# Patient Record
Sex: MALE | Race: WHITE | Employment: UNEMPLOYED | URBAN - METROPOLITAN AREA
[De-identification: names, ages, dates, MRNs, and addresses within clinical notes are randomized per-mention and may not be internally consistent; named-entity substitution may affect disease eponyms.]

---

## 2021-03-04 ENCOUNTER — HOSPITAL ENCOUNTER (EMERGENCY)
Age: 71
Discharge: HOME | End: 2021-03-04
Attending: EMERGENCY MEDICINE

## 2021-03-04 VITALS
HEART RATE: 80 BPM | RESPIRATION RATE: 16 BRPM | BODY MASS INDEX: 18.96 KG/M2 | OXYGEN SATURATION: 99 % | WEIGHT: 128 LBS | DIASTOLIC BLOOD PRESSURE: 80 MMHG | TEMPERATURE: 98.5 F | SYSTOLIC BLOOD PRESSURE: 140 MMHG | HEIGHT: 69 IN

## 2021-03-04 DIAGNOSIS — R33.9 URINARY RETENTION: Primary | ICD-10-CM

## 2021-03-04 DIAGNOSIS — F43.0 STRESS RESPONSE: ICD-10-CM

## 2021-03-04 DIAGNOSIS — N28.9 ACUTE RENAL INSUFFICIENCY: ICD-10-CM

## 2021-03-04 LAB
ALBUMIN SERPL-MCNC: 3.7 G/DL (ref 3.5–5)
ALBUMIN/GLOB SERPL: 1.1 {RATIO} (ref 1.1–2.2)
ALP SERPL-CCNC: 83 U/L (ref 45–117)
ALT SERPL-CCNC: 44 U/L (ref 12–78)
ANION GAP SERPL CALC-SCNC: 10 MMOL/L (ref 5–15)
ANION GAP SERPL CALC-SCNC: 14 MMOL/L (ref 5–15)
APPEARANCE UR: ABNORMAL
AST SERPL-CCNC: 40 U/L (ref 15–37)
BACTERIA URNS QL MICRO: NEGATIVE /HPF
BASOPHILS # BLD: 0 K/UL (ref 0–0.1)
BASOPHILS NFR BLD: 0 % (ref 0–1)
BILIRUB SERPL-MCNC: 0.7 MG/DL (ref 0.2–1)
BILIRUB UR QL: NEGATIVE
BUN SERPL-MCNC: 50 MG/DL (ref 6–20)
BUN SERPL-MCNC: 57 MG/DL (ref 6–20)
BUN/CREAT SERPL: 17 (ref 12–20)
BUN/CREAT SERPL: 20 (ref 12–20)
CALCIUM SERPL-MCNC: 8.4 MG/DL (ref 8.5–10.1)
CALCIUM SERPL-MCNC: 9.2 MG/DL (ref 8.5–10.1)
CHLORIDE SERPL-SCNC: 105 MMOL/L (ref 97–108)
CHLORIDE SERPL-SCNC: 111 MMOL/L (ref 97–108)
CO2 SERPL-SCNC: 26 MMOL/L (ref 21–32)
CO2 SERPL-SCNC: 27 MMOL/L (ref 21–32)
COLOR UR: ABNORMAL
CREAT SERPL-MCNC: 2.47 MG/DL (ref 0.7–1.3)
CREAT SERPL-MCNC: 3.29 MG/DL (ref 0.7–1.3)
DIFFERENTIAL METHOD BLD: ABNORMAL
EOSINOPHIL # BLD: 0 K/UL (ref 0–0.4)
EOSINOPHIL NFR BLD: 0 % (ref 0–7)
EPITH CASTS URNS QL MICRO: ABNORMAL /LPF
ERYTHROCYTE [DISTWIDTH] IN BLOOD BY AUTOMATED COUNT: 12.9 % (ref 11.5–14.5)
GLOBULIN SER CALC-MCNC: 3.4 G/DL (ref 2–4)
GLUCOSE SERPL-MCNC: 117 MG/DL (ref 65–100)
GLUCOSE SERPL-MCNC: 141 MG/DL (ref 65–100)
GLUCOSE UR STRIP.AUTO-MCNC: NEGATIVE MG/DL
HCT VFR BLD AUTO: 47.7 % (ref 36.6–50.3)
HGB BLD-MCNC: 15.7 G/DL (ref 12.1–17)
HGB UR QL STRIP: ABNORMAL
IMM GRANULOCYTES # BLD AUTO: 0 K/UL (ref 0–0.04)
IMM GRANULOCYTES NFR BLD AUTO: 0 % (ref 0–0.5)
KETONES UR QL STRIP.AUTO: ABNORMAL MG/DL
LEUKOCYTE ESTERASE UR QL STRIP.AUTO: NEGATIVE
LYMPHOCYTES # BLD: 0.3 K/UL (ref 0.8–3.5)
LYMPHOCYTES NFR BLD: 2 % (ref 12–49)
MCH RBC QN AUTO: 30.2 PG (ref 26–34)
MCHC RBC AUTO-ENTMCNC: 32.9 G/DL (ref 30–36.5)
MCV RBC AUTO: 91.7 FL (ref 80–99)
MONOCYTES # BLD: 1.2 K/UL (ref 0–1)
MONOCYTES NFR BLD: 9 % (ref 5–13)
NEUTS SEG # BLD: 11.6 K/UL (ref 1.8–8)
NEUTS SEG NFR BLD: 89 % (ref 32–75)
NITRITE UR QL STRIP.AUTO: NEGATIVE
NRBC # BLD: 0 K/UL (ref 0–0.01)
NRBC BLD-RTO: 0 PER 100 WBC
PH UR STRIP: 5.5 [PH] (ref 5–8)
PLATELET # BLD AUTO: 159 K/UL (ref 150–400)
PLATELET COMMENTS,PCOM: ABNORMAL
PMV BLD AUTO: 11.9 FL (ref 8.9–12.9)
POTASSIUM SERPL-SCNC: 4.1 MMOL/L (ref 3.5–5.1)
POTASSIUM SERPL-SCNC: 4.2 MMOL/L (ref 3.5–5.1)
PROT SERPL-MCNC: 7.1 G/DL (ref 6.4–8.2)
PROT UR STRIP-MCNC: 30 MG/DL
RBC # BLD AUTO: 5.2 M/UL (ref 4.1–5.7)
RBC #/AREA URNS HPF: ABNORMAL /HPF (ref 0–5)
RBC MORPH BLD: ABNORMAL
SODIUM SERPL-SCNC: 145 MMOL/L (ref 136–145)
SODIUM SERPL-SCNC: 148 MMOL/L (ref 136–145)
SP GR UR REFRACTOMETRY: 1.01 (ref 1–1.03)
UA: UC IF INDICATED,UAUC: ABNORMAL
UROBILINOGEN UR QL STRIP.AUTO: 0.2 EU/DL (ref 0.2–1)
WBC # BLD AUTO: 13.1 K/UL (ref 4.1–11.1)
WBC URNS QL MICRO: ABNORMAL /HPF (ref 0–4)

## 2021-03-04 PROCEDURE — 51798 US URINE CAPACITY MEASURE: CPT

## 2021-03-04 PROCEDURE — 99285 EMERGENCY DEPT VISIT HI MDM: CPT

## 2021-03-04 PROCEDURE — 74011000250 HC RX REV CODE- 250: Performed by: EMERGENCY MEDICINE

## 2021-03-04 PROCEDURE — 80053 COMPREHEN METABOLIC PANEL: CPT

## 2021-03-04 PROCEDURE — 81001 URINALYSIS AUTO W/SCOPE: CPT

## 2021-03-04 PROCEDURE — 85025 COMPLETE CBC W/AUTO DIFF WBC: CPT

## 2021-03-04 PROCEDURE — 36415 COLL VENOUS BLD VENIPUNCTURE: CPT

## 2021-03-04 PROCEDURE — 74011250636 HC RX REV CODE- 250/636: Performed by: EMERGENCY MEDICINE

## 2021-03-04 PROCEDURE — 87086 URINE CULTURE/COLONY COUNT: CPT

## 2021-03-04 RX ORDER — LIDOCAINE HYDROCHLORIDE 20 MG/ML
JELLY TOPICAL
Status: COMPLETED | OUTPATIENT
Start: 2021-03-04 | End: 2021-03-04

## 2021-03-04 RX ADMIN — SODIUM CHLORIDE 1000 ML: 9 INJECTION, SOLUTION INTRAVENOUS at 20:06

## 2021-03-04 RX ADMIN — LIDOCAINE HYDROCHLORIDE: 20 JELLY TOPICAL at 20:06

## 2021-03-04 RX ADMIN — SODIUM CHLORIDE 1000 ML: 9 INJECTION, SOLUTION INTRAVENOUS at 21:09

## 2021-03-04 NOTE — ED TRIAGE NOTES
Pt brought to the ED  By Gary W Melodie Holbrook as an ECO initiated at 17:03. Pt is AAOX4 with a c/c of mental health problem and bilateral flank pain since Tuesday. Pt states he has trouble urinating and he strains a lot but urine does not come out. Pt's abd is noted distended. Pt states on Tuesday he had severe bilateral flank pain and to deal with the pain he cut his LUE with a . Pt refused RN to look at self inflicted cut. Pt states he was not feeling suicidal. Pt denies any SI/HI or hallucinations. Pt states he does not have any Psychiatric medical hx. Pt refused to change out of his street clothes. [employer] officer at bedside. Pt is noted in stable condition, now in ED room with side rail up, bed to lowest position and call light within reach. Will continue to monitor and wait for ED provider evaluation. Emergency Department Nursing Plan of Care       The Nursing Plan of Care is developed from the Nursing assessment and Emergency Department Attending provider initial evaluation. The plan of care may be reviewed in the ED Provider note.     The Plan of Care was developed with the following considerations:   Patient / Family readiness to learn indicated by:verbalized understanding  Persons(s) to be included in education: patient  Barriers to Learning/Limitations:No    Signed     Ambreen Morrissey    3/4/2021   6:18 PM

## 2021-03-05 NOTE — PROGRESS NOTES
Date of previous inpatient admission/ ED visit? New encounter. What brought the patient back to ED? Pt brought to the ED  By Toño DUMONT as an ECO initiated at 17:03. Pt is AAOX4 with a c/c of mental health problem and bilateral flank pain since Tuesday. Pt states he has trouble urinating and he strains a lot but urine does not come out. Pt's abd is noted distended. Pt states on Tuesday he had severe bilateral flank pain and to deal with the pain he cut his LUE with a . Pt refused RN to look at self inflicted cut. Pt states he was not feeling suicidal. Pt denies any SI/HI or hallucinations. Pt states he does not have any Psychiatric medical hx. Pt refused to change out of his street clothes. [employer] officer at bedside. Pt is noted in stable condition, now in ED room with side rail up, bed to lowest position and call light within reach. Will continue to monitor and wait for ED provider evaluation. Did patient decline recommended services during last admission/ ED visit (if yes, what)? No     Has patient seen a provider since their last inpatient admission/ED visit (if yes, when)? Unknown     PCP: First and Last name: none    Name of Practice:    Are you a current patient: Yes/No:    Approximate date of last visit:    [facility] Interventions:  From previous inpatient admission/ED visit:  From current inpatient admission/ED visit     CM opened case for assessment of D/C planning needs, CM reviewed chart. Patient recently lost his mom states \"my dad was a bum, my mother was a strong [place] woman she raised us on her own\"   CM was ask to assist patient with insurance per conversation. Patient states he use to work for [employer] and retired with [employer] had pension with them receiving his checks but never applied for medicare did not know he had to apply. Patient has no insurance at this time upset he was brought to the hospital and worries about this bill.  CM spoke with patient at bedside inform not an easy process to get his Medicare benefit but referral for Medicaid would be more quicker. Discuss follow-up with Senior connections for support in the community. Patient does not have a reliable phone states he canceled his phone best way of getting in contact with him is through the mail. Patient has a neighbor that he uses his phone from time to time and talks to him from time to time but did not want to inconvenient his neighbor . 1943 CM inform patient will need to follow-up with Urology for urinary retention. Office is close at this time CM not able to schedule follow-up appointment for patient will have difficulty reaching patient for follow-up since patient will be discharge with a ceron needs follow-up. 1950 CM spoke with patient inform of follow-up with Urology patient states he is able to call in the morning CM inform patient office has financial assistance and to ask for an application. 2149 Per chart provider would like to admit patient at this time patient is not willing to stay. CM spoke with patient he is concern about the hospital bill. CM discuss the  care application and inform him what he needs to do to apply. Discuss Medicaid with patient again he stated that if he had the number he would be able to call medassist and patient access. CM added information to discharge AVS for patient to follow-up. Patient is able to drive himself to the appointment.      [address]  [phone]

## 2021-03-05 NOTE — ED NOTES
9:07 PM-discussed results and plan with patient. Expressed my concern about his renal function as we do not have a baseline. Discussed admitting him to the hospital for urology and possible nephrology consult. He states he cannot stay in the hospital.  Plan will be to give 2 L of saline and recheck creatinine. If that is improving we will discharge him with a leg bag catheter and close follow-up to urology. He is aware though that his renal function could worsen as we still do not know the obvious cause of his obstruction. He understands that and will make sure he does the follow-up as needed. Also discussed with him his self injury thoughts that he had on Tuesday. He states this was all related to severe pain and just wanted to end things because he was hurting. He is very remorseful and states that he had gone to the hospital and had the catheter he would not have even had that thought and does not have any suicidal homicidal thoughts at this time. 10:30 PM patient continues to be stable. 2000 cc urine out. Will repeat chemistries after 2 L saline. Recommend patient get a CT due to his prior flank pain to rule out any other obstructive uropathy. Patient declines a CT at this time. He is made aware that this could put him at risk for renal failure other possibilities that go undiagnosed. He would follow-up with urology as planned. 10:53 PM patient's repeat creatinine improved at 2.47. Again reinforced the importance of close follow-up with urology. We did offer admission for further evaluation of his urinary obstruction and the patient declined. He is aware that there is a risk of missed diagnosis including tumor, aneurysm and risk to loss of life. His vital signs are stable he is ready for discharge.

## 2021-03-05 NOTE — ED NOTES
Patient stated he feels much better with the urinary catheter. Stated the pressure and pain in his abdomen and kidneys have improved. Denies pain currently. Pt resting quietly in room at this time. No complaints.

## 2021-03-05 NOTE — ED NOTES
Patient has several superficial lacerations on his left wrist and elbow. [facility] and placed sterile strips on lacerations. This RN looked at the lacerations with the MD and placed new padding and guaze to wounds. No bleeding or drainage noted.

## 2021-03-05 NOTE — ED NOTES
Bedside and Verbal shift change report given to Ekta Oconnell (oncoming nurse) by Ivett Ulloa (offgoing nurse). Report included the following information SBAR, Kardex, ED Summary and Intake/Output.

## 2021-03-05 NOTE — ED PROVIDER NOTES
EMERGENCY DEPARTMENT HISTORY AND PHYSICAL EXAM      Date: 3/4/2021  Patient Name: Alphonso Dorsey    History of Presenting Illness     Chief Complaint   Patient presents with   [address] Problem    Flank Pain     bilateral        History Provided By: Patient    HPI: Alphonso Dorsey, 73 y.o. male with PMHx significant for nothing who presents in police custody for mental health problem. Patient reports that he began having some flank pain and lower abdominal distention 2 days ago. Due to the significant amount of pain he cut himself on his wrist.  [facility] and they became concerned given his suicidal gesture and therefore got an E CO and he was brought to the emergency department. Patient denies any suicidal or homicidal thoughts. He said he just cut himself due to the severity of his pain. Denies any prior psychiatric history. No CP, SOB, N/V. Does state he has been having difficulty urinating and is urinating and \"dribbles. \"      PCP: No primary care provider on file. There are no other complaints, changes, or physical findings at this time. Current Facility-Administered Medications   Medication Dose Route Frequency Provider Last Rate Last Admin    lidocaine (URO-JET/ GLYDO) 2 % jelly   Urethral NOW Catlett, Arlyne Gosselin, MD         Past History     Past Medical History:  History reviewed. No pertinent past medical history. Past Surgical History:  History reviewed. No pertinent surgical history. Family History:  History reviewed. No pertinent family history. Social History:  Social History     Tobacco Use    Smoking status: Never Smoker    Smokeless tobacco: Never Used   Substance Use Topics    Alcohol use: Not Currently    Drug use: Never     Allergies:  No Known Allergies  Review of Systems   Review of Systems   Constitutional: Negative for chills and fever. HENT: Negative for congestion, rhinorrhea and sore throat.     Respiratory: Negative for cough and shortness of breath. Cardiovascular: Negative for chest pain. Gastrointestinal: Negative for abdominal pain, nausea and vomiting. Genitourinary: Positive for decreased urine volume and flank pain. Negative for dysuria and urgency. Skin: Negative for rash. Neurological: Negative for dizziness, light-headedness and headaches. All other systems reviewed and are negative. Physical Exam   Physical Exam  Vitals signs and nursing note reviewed. Constitutional:       General: He is not in acute distress. Appearance: He is well-developed. HENT:      Head: Normocephalic and atraumatic. Eyes:      Conjunctiva/sclera: Conjunctivae normal.      Pupils: Pupils are equal, round, and reactive to light. Neck:      Musculoskeletal: Normal range of motion. Cardiovascular:      Rate and Rhythm: Normal rate and regular rhythm. Pulmonary:      Effort: Pulmonary effort is normal. No respiratory distress. Breath sounds: Normal breath sounds. No stridor. Abdominal:      General: There is distension (suprapubic). Palpations: Abdomen is soft. Tenderness: There is no abdominal tenderness. Comments: palpable bladder   Musculoskeletal: Normal range of motion. Skin:     General: Skin is warm and dry. Comments: Superficial cuts over the left wrist and left AC   Neurological:      Mental Status: He is alert and oriented to person, place, and time. Psychiatric:         Thought Content: Thought content does not include homicidal or suicidal ideation. Diagnostic Study Results   Labs -     Recent Results (from the past 12 hour(s))   CBC WITH AUTOMATED DIFF    Collection Time: 03/04/21  6:55 PM   Result Value Ref Range    WBC 13.1 (H) 4.1 - 11.1 K/uL    RBC 5.20 4. 10 - 5.70 M/uL    HGB 15.7 12.1 - 17.0 g/dL    HCT 47.7 36.6 - 50.3 %    MCV 91.7 80.0 - 99.0 FL    MCH 30.2 26.0 - 34.0 PG    MCHC 32.9 30.0 - 36.5 g/dL    RDW 12.9 11.5 - 14.5 %    PLATELET 995 634 - 400 K/uL    MPV 11.9 8.9 - 12.9 FL    NRBC 0.0 0  WBC    ABSOLUTE NRBC 0.00 0.00 - 0.01 K/uL    NEUTROPHILS PENDING %    LYMPHOCYTES PENDING %    MONOCYTES PENDING %    EOSINOPHILS PENDING %    BASOPHILS PENDING %    IMMATURE GRANULOCYTES PENDING %    ABS. NEUTROPHILS PENDING K/UL    ABS. LYMPHOCYTES PENDING K/UL    ABS. MONOCYTES PENDING K/UL    ABS. EOSINOPHILS PENDING K/UL    ABS. BASOPHILS PENDING K/UL    ABS. IMM. GRANS. PENDING K/UL    DF PENDING      Recent Results (from the past 24 hour(s))   CBC WITH AUTOMATED DIFF    Collection Time: 03/04/21  6:55 PM   Result Value Ref Range    WBC 13.1 (H) 4.1 - 11.1 K/uL    RBC 5.20 4. 10 - 5.70 M/uL    HGB 15.7 12.1 - 17.0 g/dL    HCT 47.7 36.6 - 50.3 %    MCV 91.7 80.0 - 99.0 FL    MCH 30.2 26.0 - 34.0 PG    MCHC 32.9 30.0 - 36.5 g/dL    RDW 12.9 11.5 - 14.5 %    PLATELET 326 223 - 719 K/uL    MPV 11.9 8.9 - 12.9 FL    NRBC 0.0 0  WBC    ABSOLUTE NRBC 0.00 0.00 - 0.01 K/uL    NEUTROPHILS 89 (H) 32 - 75 %    LYMPHOCYTES 2 (L) 12 - 49 %    MONOCYTES 9 5 - 13 %    EOSINOPHILS 0 0 - 7 %    BASOPHILS 0 0 - 1 %    IMMATURE GRANULOCYTES 0 0.0 - 0.5 %    ABS. NEUTROPHILS 11.6 (H) 1.8 - 8.0 K/UL    ABS. LYMPHOCYTES 0.3 (L) 0.8 - 3.5 K/UL    ABS. MONOCYTES 1.2 (H) 0.0 - 1.0 K/UL    ABS. EOSINOPHILS 0.0 0.0 - 0.4 K/UL    ABS. BASOPHILS 0.0 0.0 - 0.1 K/UL    ABS. IMM.  GRANS. 0.0 0.00 - 0.04 K/UL    DF SMEAR SCANNED      PLATELET COMMENTS Large Platelets      RBC COMMENTS NORMOCYTIC, NORMOCHROMIC     METABOLIC PANEL, COMPREHENSIVE    Collection Time: 03/04/21  6:55 PM   Result Value Ref Range    Sodium 145 136 - 145 mmol/L    Potassium 4.2 3.5 - 5.1 mmol/L    Chloride 105 97 - 108 mmol/L    CO2 26 21 - 32 mmol/L    Anion gap 14 5 - 15 mmol/L    Glucose 141 (H) 65 - 100 mg/dL    BUN 57 (H) 6 - 20 MG/DL    Creatinine 3.29 (H) 0.70 - 1.30 MG/DL    BUN/Creatinine ratio 17 12 - 20      GFR est AA 23 (L) >60 ml/min/1.73m2    GFR est non-AA 19 (L) >60 ml/min/1.73m2 Calcium 9.2 8.5 - 10.1 MG/DL    Bilirubin, total 0.7 0.2 - 1.0 MG/DL    ALT (SGPT) 44 12 - 78 U/L    AST (SGOT) 40 (H) 15 - 37 U/L    Alk. phosphatase 83 45 - 117 U/L    Protein, total 7.1 6.4 - 8.2 g/dL    Albumin 3.7 3.5 - 5.0 g/dL    Globulin 3.4 2.0 - 4.0 g/dL    A-G Ratio 1.1 1.1 - 2.2     URINALYSIS W/ REFLEX CULTURE    Collection Time: 03/04/21  8:15 PM    Specimen: Urine   Result Value Ref Range    Color YELLOW/STRAW      Appearance CLOUDY (A) CLEAR      Specific gravity 1.015 1.003 - 1.030      pH (UA) 5.5 5.0 - 8.0      Protein 30 (A) NEG mg/dL    Glucose Negative NEG mg/dL    Ketone TRACE (A) NEG mg/dL    Bilirubin Negative NEG      Blood LARGE (A) NEG      Urobilinogen 0.2 0.2 - 1.0 EU/dL    Nitrites Negative NEG      Leukocyte Esterase Negative NEG      WBC 10-20 0 - 4 /hpf    RBC 20-50 0 - 5 /hpf    Epithelial cells FEW FEW /lpf    Bacteria Negative NEG /hpf    UA:UC IF INDICATED URINE CULTURE ORDERED (A) CNI     METABOLIC PANEL, BASIC    Collection Time: 03/04/21  9:55 PM   Result Value Ref Range    Sodium 148 (H) 136 - 145 mmol/L    Potassium 4.1 3.5 - 5.1 mmol/L    Chloride 111 (H) 97 - 108 mmol/L    CO2 27 21 - 32 mmol/L    Anion gap 10 5 - 15 mmol/L    Glucose 117 (H) 65 - 100 mg/dL    BUN 50 (H) 6 - 20 MG/DL    Creatinine 2.47 (H) 0.70 - 1.30 MG/DL    BUN/Creatinine ratio 20 12 - 20      GFR est AA 32 (L) >60 ml/min/1.73m2    GFR est non-AA 26 (L) >60 ml/min/1.73m2    Calcium 8.4 (L) 8.5 - 10.1 MG/DL         Radiologic Studies -   No orders to display     No results found. Medical Decision Making   I am the first provider for this patient. I reviewed the vital signs, available nursing notes, past medical history, past surgical history, family history and social history. Vital Signs-Reviewed the patient's vital signs.   Patient Vitals for the past 12 hrs:   Temp Pulse Resp BP SpO2   03/04/21 1800 98.4 °F (36.9 °C) 88 16 (!) 141/84 100 %       Pulse Oximetry Analysis - 99% on ra      Records Reviewed: Nursing Notes    Provider Notes (Medical Decision Making):   Patient presents in police custody for mental health problem. Tells me that he cut his left upper extremity several days ago due to pain in his back. States he was not trying to kill himself but it was a way to relieve the pain. Denies any current suicidal thoughts. Sarah Bentley for crisis to see the patient. On exam the patient has significant suprapubic distention a palpable bladder. I am concerned his pain is secondary to urinary retention. Patient declines any additional work-up at this time until he spoke with a counselor. ED Course:   Initial assessment performed. The patients presenting problems have been discussed, and they are in agreement with the care plan formulated and outlined with them. I have encouraged them to ask questions as they arise throughout their visit. Patient has been seen by crisis and cleared from a psychiatric standpoint. He is now agreeable to lab work, bladder scan, and likely Ceron catheter placement given his exam.    ED Course as of Mar 04 1924   Thu Mar 04, 2021   1912 Bladder scan with over 1200cc. Plan for ceron, UA.    [BARTOLO]      ED Course User Index  Anna Lara MD       Labs returned with an elevated creatinine, suspect likely secondary to obstruction. Plan for Ceron catheter placement, IV fluids. Patient signed out to Dr. Raji Umaña. Patient does not wish to stay in the hospital, but is agreeable to IV fluids and a repeat creatinine. If no significant improvement plan for repeat discussion about possible hospitalization. Please see Dr. [facility] separate note for updates. Denzel Chavira MD      Procedures:  Procedures    Critical Care:  none    Disposition:  Discharge    PLAN:  1. There are no discharge medications for this patient.     2.   Follow-up Information     Follow up With Specialties Details Why Contact [facility] Office on [address]  will call for assistance to apply for medicaid and medicare. [address]  [facility]   Patient to call office and schedule follow-up as discuss it is important you call and schedule the appointment. [address] [address]    Medicaid application  Schedule an appointment as soon as possible for a visit in 1 day  Please call [facility] care card application   Please use for additional services. Please call Ms. [address]    [facility]   If symptoms worsen [address]    [facility] EMERGENCY DEPT Emergency Medicine   [facility]  [phone]        Return to ED if worse     Diagnosis     Clinical Impression:   1. Urinary retention    2. Acute renal insufficiency    3. Stress response            Please note that this dictation was completed with [employer], the computer voice recognition software. Quite often unanticipated grammatical, syntax, homophones, and other interpretive errors are inadvertently transcribed by the computer software. Please disregard these errors.   Please excuse any errors that have escaped final proofreading

## 2021-03-06 LAB
BACTERIA SPEC CULT: NORMAL
SERVICE CMNT-IMP: NORMAL

## 2021-03-13 ENCOUNTER — HOSPITAL ENCOUNTER (EMERGENCY)
Age: 71
Discharge: HOME | End: 2021-03-13
Attending: EMERGENCY MEDICINE

## 2021-03-13 VITALS
TEMPERATURE: 98.7 F | HEART RATE: 84 BPM | RESPIRATION RATE: 18 BRPM | OXYGEN SATURATION: 97 % | DIASTOLIC BLOOD PRESSURE: 89 MMHG | BODY MASS INDEX: 19.2 KG/M2 | SYSTOLIC BLOOD PRESSURE: 135 MMHG | WEIGHT: 130 LBS

## 2021-03-13 DIAGNOSIS — R33.9 URINARY RETENTION: Primary | ICD-10-CM

## 2021-03-13 DIAGNOSIS — N30.01 ACUTE CYSTITIS WITH HEMATURIA: ICD-10-CM

## 2021-03-13 LAB
APPEARANCE UR: CLEAR
BACTERIA URNS QL MICRO: ABNORMAL /HPF
BILIRUB UR QL: NEGATIVE
COLOR UR: ABNORMAL
EPITH CASTS URNS QL MICRO: ABNORMAL /LPF
GLUCOSE UR STRIP.AUTO-MCNC: NEGATIVE MG/DL
HGB UR QL STRIP: ABNORMAL
KETONES UR QL STRIP.AUTO: NEGATIVE MG/DL
LEUKOCYTE ESTERASE UR QL STRIP.AUTO: ABNORMAL
NITRITE UR QL STRIP.AUTO: POSITIVE
PH UR STRIP: 5.5 [PH] (ref 5–8)
PROT UR STRIP-MCNC: NEGATIVE MG/DL
RBC #/AREA URNS HPF: ABNORMAL /HPF (ref 0–5)
SP GR UR REFRACTOMETRY: 1.02 (ref 1–1.03)
UA: UC IF INDICATED,UAUC: ABNORMAL
UROBILINOGEN UR QL STRIP.AUTO: 0.2 EU/DL (ref 0.2–1)
WBC URNS QL MICRO: ABNORMAL /HPF (ref 0–4)

## 2021-03-13 PROCEDURE — 87186 SC STD MICRODIL/AGAR DIL: CPT

## 2021-03-13 PROCEDURE — 81001 URINALYSIS AUTO W/SCOPE: CPT

## 2021-03-13 PROCEDURE — 99284 EMERGENCY DEPT VISIT MOD MDM: CPT

## 2021-03-13 PROCEDURE — 87077 CULTURE AEROBIC IDENTIFY: CPT

## 2021-03-13 PROCEDURE — 87086 URINE CULTURE/COLONY COUNT: CPT

## 2021-03-13 PROCEDURE — 51702 INSERT TEMP BLADDER CATH: CPT

## 2021-03-13 RX ORDER — CIPROFLOXACIN 500 MG/1
500 TABLET ORAL 2 TIMES DAILY
Qty: 20 TAB | Refills: 0 | Status: SHIPPED | OUTPATIENT
Start: 2021-03-13 | End: 2021-03-23

## 2021-03-13 NOTE — ED TRIAGE NOTES
Requests FC. Pt had one removed by the urologist and refused to have another placed but has been unable to urinate since then.

## 2021-03-13 NOTE — ED NOTES
Patient presents to the ED with c/o urinary retention. Pt reports having a ceron removed on Thursday and he has not been able to urinate. Pt reports some \"dripping and leaking. \" Pt reports lower abdominal pain. [facility]. Pt is alert and oriented. Pt skin is warm and dry. Pt is ambulatory independently. .      Emergency Department Nursing Plan of Care       The Nursing Plan of Care is developed from the Nursing assessment and Emergency Department Attending provider initial evaluation. The plan of care may be reviewed in the ED Provider note.     The Plan of Care was developed with the following considerations:   Patient / Family readiness to learn indicated by:verbalized understanding  Persons(s) to be included in education: patient  Barriers to Learning/Limitations:No    Signed     Clark Mcmahan    3/13/2021   10:06 AM
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
## 2021-03-13 NOTE — ED NOTES
1400 mL drained when ceron was placed. Leg bag placed on patient per patient request. PT instructed and verbalized understanding of ceron care and draining the leg bag.

## 2021-03-15 LAB
BACTERIA SPEC CULT: ABNORMAL
CC UR VC: ABNORMAL
SERVICE CMNT-IMP: ABNORMAL

## 2022-04-21 ENCOUNTER — HOSPITAL ENCOUNTER (OUTPATIENT)
Dept: INTERVENTIONAL RADIOLOGY/VASCULAR | Age: 72
Discharge: HOME | End: 2022-04-21
Attending: STUDENT IN AN ORGANIZED HEALTH CARE EDUCATION/TRAINING PROGRAM | Admitting: STUDENT IN AN ORGANIZED HEALTH CARE EDUCATION/TRAINING PROGRAM
Payer: MEDICARE

## 2022-04-21 VITALS
SYSTOLIC BLOOD PRESSURE: 114 MMHG | RESPIRATION RATE: 18 BRPM | HEART RATE: 72 BPM | WEIGHT: 135 LBS | TEMPERATURE: 98.4 F | DIASTOLIC BLOOD PRESSURE: 77 MMHG | HEIGHT: 69 IN | OXYGEN SATURATION: 98 % | BODY MASS INDEX: 19.99 KG/M2

## 2022-04-21 DIAGNOSIS — R33.9 URINARY RETENTION: ICD-10-CM

## 2022-04-21 PROCEDURE — 77030040831 HC BAG URINE DRNG MDII -A

## 2022-04-21 PROCEDURE — C1769 GUIDE WIRE: HCPCS

## 2022-04-21 PROCEDURE — 77002 NEEDLE LOCALIZATION BY XRAY: CPT

## 2022-04-21 PROCEDURE — 74011000250 HC RX REV CODE- 250: Performed by: STUDENT IN AN ORGANIZED HEALTH CARE EDUCATION/TRAINING PROGRAM

## 2022-04-21 PROCEDURE — 2709999900 HC NON-CHARGEABLE SUPPLY

## 2022-04-21 PROCEDURE — 74011250636 HC RX REV CODE- 250/636: Performed by: STUDENT IN AN ORGANIZED HEALTH CARE EDUCATION/TRAINING PROGRAM

## 2022-04-21 PROCEDURE — 77030011230 HC DIL VESL COON COOK -B

## 2022-04-21 PROCEDURE — C1892 INTRO/SHEATH,FIXED,PEEL-AWAY: HCPCS

## 2022-04-21 PROCEDURE — 77030011229 HC DIL VESL COON COOK -A

## 2022-04-21 RX ORDER — MIDAZOLAM HYDROCHLORIDE 1 MG/ML
5 INJECTION, SOLUTION INTRAMUSCULAR; INTRAVENOUS
Status: DISCONTINUED | OUTPATIENT
Start: 2022-04-21 | End: 2022-04-21 | Stop reason: HOSPADM

## 2022-04-21 RX ORDER — SODIUM CHLORIDE 9 MG/ML
50 INJECTION, SOLUTION INTRAVENOUS CONTINUOUS
Status: DISCONTINUED | OUTPATIENT
Start: 2022-04-21 | End: 2022-04-21 | Stop reason: HOSPADM

## 2022-04-21 RX ORDER — LIDOCAINE HYDROCHLORIDE 20 MG/ML
20 INJECTION, SOLUTION INFILTRATION; PERINEURAL ONCE
Status: COMPLETED | OUTPATIENT
Start: 2022-04-21 | End: 2022-04-21

## 2022-04-21 RX ORDER — FENTANYL CITRATE 50 UG/ML
200 INJECTION, SOLUTION INTRAMUSCULAR; INTRAVENOUS
Status: DISCONTINUED | OUTPATIENT
Start: 2022-04-21 | End: 2022-04-21 | Stop reason: HOSPADM

## 2022-04-21 RX ORDER — NALOXONE HYDROCHLORIDE 0.4 MG/ML
0.4 INJECTION, SOLUTION INTRAMUSCULAR; INTRAVENOUS; SUBCUTANEOUS AS NEEDED
Status: DISCONTINUED | OUTPATIENT
Start: 2022-04-21 | End: 2022-04-21 | Stop reason: HOSPADM

## 2022-04-21 RX ORDER — FLUMAZENIL 0.1 MG/ML
0.5 INJECTION INTRAVENOUS ONCE
Status: DISCONTINUED | OUTPATIENT
Start: 2022-04-21 | End: 2022-04-21 | Stop reason: HOSPADM

## 2022-04-21 RX ADMIN — FENTANYL CITRATE 50 MCG: 50 INJECTION, SOLUTION INTRAMUSCULAR; INTRAVENOUS at 09:24

## 2022-04-21 RX ADMIN — CEFAZOLIN SODIUM 2 G: 1 INJECTION, POWDER, FOR SOLUTION INTRAMUSCULAR; INTRAVENOUS at 08:21

## 2022-04-21 RX ADMIN — MIDAZOLAM 2 MG: 1 INJECTION INTRAMUSCULAR; INTRAVENOUS at 09:15

## 2022-04-21 RX ADMIN — FENTANYL CITRATE 100 MCG: 50 INJECTION, SOLUTION INTRAMUSCULAR; INTRAVENOUS at 09:15

## 2022-04-21 RX ADMIN — LIDOCAINE HYDROCHLORIDE 10 ML: 20 INJECTION, SOLUTION INFILTRATION; PERINEURAL at 09:21

## 2022-04-21 RX ADMIN — MIDAZOLAM 1 MG: 1 INJECTION INTRAMUSCULAR; INTRAVENOUS at 09:24

## 2022-04-21 NOTE — DISCHARGE INSTRUCTIONS
Patient Education        Suprapubic Catheter Care: Care Instructions  Overview  A suprapubic catheter is a thin tube that drains urine from your bladder. The tube is put into your bladder through a small cut in your lower belly. The urine collects in a bag attached to the tube. The bag is usually attached to your leg. Sometimes the catheter tube has a valve that lets you drain the urine into the toilet or other container. You may need a suprapubic catheter if you have nerve damage, a problem with your urinary tract, or a disease that weakens your muscles. Having a catheter for a long time increases the risk of getting a urinary tract infection. So catheter care focuses on preventing infection. Follow-up care is a key part of your treatment and safety. Be sure to make and go to all appointments, and call your doctor if you are having problems. It's also a good idea to know your test results and keep a list of the medicines you take. How can you care for yourself at home? · Wash your hands before you handle the catheter. · Clean the area around the catheter with soap and water daily. · Keep the drainage bag lower than your bladder to keep urine from backing up. · Clean the bag every day after removing it from the catheter. Use another container while you clean the bag. To clean the bag, fill it with 2 parts vinegar to 3 parts water and let it stand for 20 minutes. Then empty it out, and let it air dry. · Empty the drainage bag when it is full or at least every 8 hours. When should you call for help? Call your doctor now or seek immediate medical care if:    · Your catheter becomes blocked and urine does not collect in the drainage bag.     · Your catheter leaks.     · You have blood or pus in your urine.     · You have pain in your back just below your rib cage.  This is called flank pain.     · You have a fever, chills, or body aches.     · You have groin or belly pain.     · Your urine is cloudy or smells bad.     · You have pain, increasing redness, or bleeding around the catheter.     · You have swelling around the catheter or in your belly. Watch closely for changes in your health, and be sure to contact your doctor if you have any problems. Where can you learn more? Go to http://www.gray.com/  Enter D213 in the search box to learn more about \"Suprapubic Catheter Care: Care Instructions. \"  Current as of: October 18, 2021               Content Version: 13.2  © 2006-2022 [employer]. Care instructions adapted under license by [employer] (which disclaims liability or warranty for this information). If you have questions about a medical condition or this instruction, always ask your healthcare professional. [address] any warranty or liability for your use of this information.

## 2022-04-21 NOTE — PROGRESS NOTES
Spoke with Tati Mendoza RN at the office of Dr. Mirian Hardy of urology. She instructed me to remove the ceron catheter. Will proceed. Supra pubic catheter is draining and functioning well.

## 2022-04-21 NOTE — PROGRESS NOTES
Pt arrives ambulatory to angio department accompanied by self/friend to  for suprapubic catheter procedure. All assessments completed and consent was reviewed. Education given was regarding procedure, moderate sedation, post-procedure care and  management/follow-up. Opportunity for questions was provided and all questions and concerns were addressed.

## 2022-04-21 NOTE — PROGRESS NOTES
Pt with uneventful suprapubic catheter insertion. Pt denies pain and catheter is draining urine well. Pt tolerated the procedure and sedation well and is now at baseline. Verbalized understanding of all discharge instructions. IV removed with hemostasis. Pick[place] removed successfully per order of Cookie Can MD of urology. Ready for discharge.

## 2022-06-02 NOTE — H&P
Interventional and Vascular Radiology History and Physical    Patient: Wilder Dwyer 81 y.o. male       Chief Complaint: Chronic urinary retention     History of Present Illness: 81year old male with chronic urinary retention presents for suprapubic catheter placement. History:    No past medical history on file. No family history on file. Social History     Socioeconomic History    Marital status: UNKNOWN     Spouse name: Not on file    Number of children: Not on file    Years of education: Not on file    Highest education level: Not on file   Occupational History    Not on file   Tobacco Use    Smoking status: Never Smoker    Smokeless tobacco: Never Used   Substance and Sexual Activity    Alcohol use: Not Currently    Drug use: Never    Sexual activity: Not on file   Other Topics Concern    Not on file   Social History Narrative    Not on file     Social Determinants of Health     Financial Resource Strain:     Difficulty of Paying Living Expenses: Not on file   Food Insecurity:     Worried About Running Out of Food in the Last Year: Not on file    Blake of Food in the Last Year: Not on file   Transportation Needs:     Lack of Transportation (Medical): Not on file    Lack of Transportation (Non-Medical):  Not on file   Physical Activity:     Days of Exercise per Week: Not on file    Minutes of Exercise per Session: Not on file   Stress:     Feeling of Stress : Not on file   Social Connections:     Frequency of Communication with Friends and Family: Not on file    Frequency of Social Gatherings with Friends and Family: Not on file    Attends Orthodox Services: Not on file    Active Member of Clubs or Organizations: Not on file    Attends Club or Organization Meetings: Not on file    Marital Status: Not on file   Intimate Partner Violence:     Fear of Current or Ex-Partner: Not on file    Emotionally Abused: Not on file    Physically Abused: Not on file    Sexually Abused: Your current Plastic Surgery problem we are working together to treat is: Cyst on your finger.      DISCHARGE INSTRUCTIONS AFTER YOUR CYST REMOVAL      ACTIVITY:    Start moving the fingers and wrist immediately after surgery to minimize long term stiffness.   No lifting more than 10 pounds (about a gallon of milk) with the affected hand for two weeks.  No restrictions of activity after sutures are removed.      MEDICATIONS:    Your routine medications can be re-started unless otherwise specified. Tylenol and ibuprofen can be used for discomfort.  Prescribed pain medications should not be required.      CARING FOR THE INCISION:    A soft, compressive bandage was applied after surgery. Please remove the bandage the third day after surgery. You may then shower and wash your hand with soap and water. I recommend re-applying a small compressive ACE bandage to the wrist and hand for 2 weeks after surgery to minimize swelling and discomfort.     EATING:   Normal diet; eating whatever is appetizing.    DON’T WORRY:   It is common to experience swelling and bruising following surgery. Healing after hand surgery takes time. You may notice intermittent stiffness and swelling within the hand for several months after surgery. Continue to modify activities to alleviate symptoms.     AVOID SMOKING: Complications from surgery are increased in those patients who smoke prior to or following surgery.    FOLLOW UP:   Please call my office the first weekday after your operation and schedule an appointment within 10-14 days after your surgery date. The sutures will be removed at that time.      UNEXPECTED EVENTS:   If you notice a sudden increase in swelling, pain or fever; contact us at [phone]. After hours we may be reached by calling [phone] and asking the  to contact Dr. Monahan.        FOLLOW-UP  It is recommended you schedule a follow-up appointment with Dr. Monahan: As needed.      Office hours are 8:00 am to 5:00 pm  Monday through Friday. If it is urgent that you speak with someone outside of these hours, the [facility] will be able to assist you. You can reach the office by calling [phone].    Thank you for choosing Brian Monahan M.D. as your Plastic Surgery provider!    At [facility], one important tool we use to improve our patient services is our Patient Survey. Following your visit you may receive our survey in the mail.     · Please take the time to complete the survey.     · If your visit with us was great, we want to hear about it.     · If we can improve, please let us know how.        Not on file   Housing Stability:     Unable to Pay for Housing in the Last Year: Not on file    Number of Places Lived in the Last Year: Not on file    Unstable Housing in the Last Year: Not on file       Allergies: No Known Allergies    Current Medications:  Current Facility-Administered Medications   Medication Dose Route Frequency    iopamidoL (ISOVUE 300) 61 % contrast injection 50 mL  50 mL IntraCATHeter RAD PRN    0.9% sodium chloride infusion  50 mL/hr IntraVENous CONTINUOUS    fentaNYL citrate (PF) injection 200 mcg  200 mcg IntraVENous Rad Multiple    flumazeniL (ROMAZICON) 0.1 mg/mL injection 0.5 mg  0.5 mg IntraVENous ONCE    midazolam (VERSED) injection 5 mg  5 mg IntraVENous Rad Multiple    naloxone (NARCAN) injection 0.4 mg  0.4 mg IntraVENous PRN        Physical Exam:  Blood pressure 114/77, pulse 72, temperature 98.4 °F (36.9 °C), resp. rate 18, height 5' 9\" (1.753 m), weight 61.2 kg (135 lb), SpO2 98 %. No acute distress  Good peripheral perfusion  Nonlabored respirations  Abdomen nondistended    Alerts:      Laboratory:    No results for input(s): HGB, HCT, WBC, PLT, INR, BUN, CREA, K, CRCLT, HGBEXT, HCTEXT, PLTEXT, INREXT in the last 72 hours. No lab exists for component: PTT, PT      Plan of Care/Planned Procedure:  Risks, benefits, and alternatives reviewed with patient and he agrees to proceed with the procedure. Conscious sedation will be performed with IV fentanyl and versed.        Emanuel Murray MD